# Patient Record
Sex: FEMALE | Race: WHITE
[De-identification: names, ages, dates, MRNs, and addresses within clinical notes are randomized per-mention and may not be internally consistent; named-entity substitution may affect disease eponyms.]

---

## 2019-12-03 ENCOUNTER — HOSPITAL ENCOUNTER (OUTPATIENT)
Dept: HOSPITAL 92 - SDC | Age: 74
Discharge: HOME | End: 2019-12-03
Attending: SURGERY
Payer: MEDICARE

## 2019-12-03 VITALS — BODY MASS INDEX: 20.1 KG/M2

## 2019-12-03 DIAGNOSIS — Z79.810: ICD-10-CM

## 2019-12-03 DIAGNOSIS — Z79.899: ICD-10-CM

## 2019-12-03 DIAGNOSIS — Z88.2: ICD-10-CM

## 2019-12-03 DIAGNOSIS — L72.0: ICD-10-CM

## 2019-12-03 DIAGNOSIS — M85.80: ICD-10-CM

## 2019-12-03 DIAGNOSIS — Z79.82: ICD-10-CM

## 2019-12-03 DIAGNOSIS — D17.22: Primary | ICD-10-CM

## 2019-12-03 PROCEDURE — 0JBF0ZZ EXCISION OF LEFT UPPER ARM SUBCUTANEOUS TISSUE AND FASCIA, OPEN APPROACH: ICD-10-PCS | Performed by: SURGERY

## 2019-12-03 PROCEDURE — 88304 TISSUE EXAM BY PATHOLOGIST: CPT

## 2019-12-03 PROCEDURE — S0020 INJECTION, BUPIVICAINE HYDRO: HCPCS

## 2019-12-03 NOTE — OP
DATE OF PROCEDURE:  12/03/2019



PREOPERATIVE DIAGNOSES:  

1. Soft tissue mass, left shoulder, greater than 4 cm.

2. Skin cyst, right posterior neck.



PROCEDURES PERFORMED:  

1. Excision of soft tissue mass, left shoulder, greater than 4 cm.

2. Excision of skin cyst, right posterior neck.



ANESTHESIA:  General.



ESTIMATED BLOOD LOSS:  Minimal.



COMPLICATIONS:  None.



SPECIMEN:  Left shoulder soft tissue mass and right posterior skin cyst.



DESCRIPTION OF PROCEDURE:  The patient was taken to the operating room and laid

supine on operating table.  After general anesthetic was obtained, the left shoulder

and right posterior neck were prepped and draped in a sterile fashion.  The previous

transverse incision over the palpable mass was reopened.  The soft tissue mass was

dissected free from the surrounding structures and removed.  Meticulous hemostasis

was obtained.  The wound was irrigated and closed using 3-0 Vicryl, 4-0 Monocryl,

and Dermabond.  Next, an elliptical incision was used to ellipse out the tissues

over the right posterior neck skin cyst.  The wound was closed using 4-0 Monocryl

and Dermabond.  The patient was sent to Recovery in stable condition.  All

instrument counts, needle counts, and lap counts were correct. 







Job ID:  [id number]

## 2019-12-05 NOTE — PQF
[facility]

POST DISCHARGE CLINICAL DOCUMENTATION IMPROVEMENT CLARIFICATION FORM 



 l



 Todays Date: 12/05/19

  l

Patients Name NICKI BHATT

MRN [id number]

  l

Acct # [id number]

  l

Admit Date 12/03/19

  l

Disch Date 12/03/19









  Name Abdifatah Campos

Email: [email or web address]

Cell: [phone] 





To be completed by : 







Present Clinical Indicators - Signs / Symptoms Results and Location in Medical 
Record 

 

[ ] Documentation of: 

 

[ ]  

 

[ ] Documentation of: 

 

[ ]  

 

[ ] Documentation of: 

 

[ ]  

 

[ ] Documentation of: 

 

[ ]  

 

[ ]  

 

Risks  

 

[ ]  

 

[ ]  

 

[ ]  

 

Treatment  

 

[ ] Lipoma  L shoulder







Epidermal inclusion cyst  R neck Query for depth (subcutaneous, intramuscular) 
and margin size of excised L shoulder lesion



Query for size and margins of excised R neck lesion

 

[ ]  

 

[ ]  













To be completed by Physician:  

PAOLA STRINGER



The documentation in this patients record requires clarification to ensure 
coding compliance and accuracy. 



Check the appropriate box and include in your discharge summary. 

[ x] sub Q, 4 cm, no margins_____________________________________________

[ ] _____________________________________________

[ ] _____________________________________________

[ ] Please check this box if this does not apply to this patient

[ ] Unable to determine

[ ] Other diagnosis: ________________________________



Review the following information and exercise your independent professional 
judgment in responding to the clarification. Based upon the clinical findings, 
risk factors, and treatment, please clarify if you are treating one of the 
above probable or suspected diagnoses. 





Physician Signature: _____________________________ Date______________ Time______
__________
MTDD